# Patient Record
Sex: FEMALE | Race: WHITE | NOT HISPANIC OR LATINO | URBAN - METROPOLITAN AREA
[De-identification: names, ages, dates, MRNs, and addresses within clinical notes are randomized per-mention and may not be internally consistent; named-entity substitution may affect disease eponyms.]

---

## 2019-03-28 ENCOUNTER — APPOINTMENT (RX ONLY)
Dept: URBAN - METROPOLITAN AREA OTHER 8 | Facility: OTHER | Age: 74
Setting detail: DERMATOLOGY
End: 2019-03-28

## 2019-03-28 DIAGNOSIS — Z41.9 ENCOUNTER FOR PROCEDURE FOR PURPOSES OTHER THAN REMEDYING HEALTH STATE, UNSPECIFIED: ICD-10-CM

## 2019-03-28 PROBLEM — E05.90 THYROTOXICOSIS, UNSPECIFIED WITHOUT THYROTOXIC CRISIS OR STORM: Status: ACTIVE | Noted: 2019-03-28

## 2019-03-28 PROBLEM — K21.9 GASTRO-ESOPHAGEAL REFLUX DISEASE WITHOUT ESOPHAGITIS: Status: ACTIVE | Noted: 2019-03-28

## 2019-03-28 PROBLEM — F41.9 ANXIETY DISORDER, UNSPECIFIED: Status: ACTIVE | Noted: 2019-03-28

## 2019-03-28 PROCEDURE — ? OTHER (COSMETIC)

## 2019-03-28 PROCEDURE — ? MEDICAL CONSULTATION: FILLERS

## 2019-03-28 PROCEDURE — ? MEDICAL CONSULTATION: DYNAMIC RHYTIDES

## 2019-03-28 ASSESSMENT — LOCATION SIMPLE DESCRIPTION DERM
LOCATION SIMPLE: RIGHT CHEEK
LOCATION SIMPLE: INFERIOR FOREHEAD
LOCATION SIMPLE: LEFT CHEEK
LOCATION SIMPLE: RIGHT LIP

## 2019-03-28 ASSESSMENT — LOCATION DETAILED DESCRIPTION DERM
LOCATION DETAILED: RIGHT MEDIAL BUCCAL CHEEK
LOCATION DETAILED: INFERIOR MID FOREHEAD
LOCATION DETAILED: RIGHT UPPER CUTANEOUS LIP
LOCATION DETAILED: LEFT SUPERIOR LATERAL MALAR CHEEK

## 2019-03-28 ASSESSMENT — LOCATION ZONE DERM
LOCATION ZONE: LIP
LOCATION ZONE: FACE

## 2019-03-28 NOTE — PROCEDURE: OTHER (COSMETIC)
Price (Use Numbers Only, No Special Characters Or $): 40
Other (Free Text): Discussed restylane silk for skin above lip and lip area $600 per syringe; restylane fillers for Nasal labial area and marionette lines $650 per syringe $100 off second syringe; dysport $13 unit, Botox $15 unit.   Advised patient to make sure blood counts are stable, also ask primary care dr, or oncologist if hyaluronic acid ok to have. Per pt seeing  next week and will ask. Patient has had Botox in fall of 2018 elsewhere.
Detail Level: Zone

## 2019-03-28 NOTE — HPI: COSMETIC CONSULTATION
When Outside In The Sun, Do You...: always tans, never burns
Additional History: Has multiple myeloma dx 4 yrs ago

## 2019-04-02 ENCOUNTER — APPOINTMENT (RX ONLY)
Dept: URBAN - METROPOLITAN AREA OTHER 8 | Facility: OTHER | Age: 74
Setting detail: DERMATOLOGY
End: 2019-04-02

## 2019-04-02 DIAGNOSIS — Z41.9 ENCOUNTER FOR PROCEDURE FOR PURPOSES OTHER THAN REMEDYING HEALTH STATE, UNSPECIFIED: ICD-10-CM

## 2019-04-02 PROCEDURE — ? ADDITIONAL NOTES

## 2019-04-02 PROCEDURE — ? DYSPORT

## 2019-04-02 NOTE — PROCEDURE: DYSPORT
Forehead Units: 0
Lot #: [id number] units used are in Botox values, multiply by2.5-3 for DSP units
Dilution (U/0.1 Cc): 5
Glabellar Complex Units: 20
Additional Comments: this is $40 less as paid for recent consult
Expiration Date (Month Year): 9/30/19
Price (Use Numbers Only, No Special Characters Or $): 042
Periorbital Skin Units: 18
Additional Area 1 Location: bunny crease
Consent: Written consent obtained. Risks include but not limited to lid/brow ptosis, bruising, swelling, diplopia, temporary effect, incomplete chemical denervation.
Post-Care Instructions: Patient instructed to not lie down for 4 hours and limit physical activity for 24 hours. Patient instructed not to travel by airplane for 48 hours.
Detail Level: Simple

## 2019-04-02 NOTE — PROCEDURE: ADDITIONAL NOTES
Additional Notes: She will ask her oncologist about fillers and then make another appointment if ok to do. DEFYNE/Refyne and Silk
Detail Level: Simple